# Patient Record
Sex: FEMALE | Race: OTHER | Employment: STUDENT | URBAN - NONMETROPOLITAN AREA
[De-identification: names, ages, dates, MRNs, and addresses within clinical notes are randomized per-mention and may not be internally consistent; named-entity substitution may affect disease eponyms.]

---
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
## 2022-10-16 NOTE — PROGRESS NOTES
Mack Bowden is here at 17w4d for:    Chief Complaint   Patient presents with    Routine Prenatal Visit     no complaints. Estimated Due Date: Estimated Date of Delivery: 3/22/19    OB History    Para Term  AB Living   1             SAB TAB Ectopic Molar Multiple Live Births                    # Outcome Date GA Lbr Vick/2nd Weight Sex Delivery Anes PTL Lv   1 Current                    Past Medical History:   Diagnosis Date    ADHD     ADHD     Nail-patella syndrome        History reviewed. No pertinent surgical history. History   Smoking Status    Passive Smoke Exposure - Never Smoker   Smokeless Tobacco    Never Used        History   Alcohol Use No       No results found for this visit on 10/16/18. Vitals:  /78   Wt 174 lb 6.4 oz (79.1 kg)   LMP 06/15/2018   Estimated body mass index is 25.23 kg/m² as calculated from the following:    Height as of 18: 5' 4\" (1.626 m). Weight as of 18: 147 lb (66.7 kg). Labs:    No results found for this visit on 10/16/18. HPI: pt here today states she is doing well. Pt does not feel movements yet    Yes PT denies fever, chills, nausea and vomiting       Abdomen: enlarged, u/u     See prenatal vital sign section and fetal assessment section    ASSESSMENT & Plan    Diagnosis Orders   1. 17 weeks gestation of pregnancy     2. Supervision of normal first teen pregnancy, second trimester               I am having Ms. Sánchez maintain her Prenatal MV-Min-Fe Fum-FA-DHA (PRENATAL 1 PO) and ondansetron. Return in about 3 weeks (around 2018) for OB 20 wk u/s. Patient Instructions     Patient Education        Weeks 18 to 25 of Your Pregnancy: Care Instructions  Your Care Instructions    Your baby is continuing to develop quickly. At this stage, babies can now suck their thumbs,  firmly with their hands, and open and close their eyelids. Sometime between 18 and 22 weeks, you will start to feel your baby move.  At PROVIDER:[TOKEN:[[id number]]],PROVIDER:[TOKEN:[[id number]]]

## (undated) DEVICE — CHLORAPREP 26ML ORANGE

## (undated) DEVICE — COVER LT HNDL BLU PLAS

## (undated) DEVICE — ELECTRODE ES AD CRD L15FT DISP FOR PT BELOW 30LB REM

## (undated) DEVICE — STAPLER EXT SKIN 35 WIDE S STL STPL SQUEEZE HNDL VISISTAT

## (undated) DEVICE — LABEL SYS CORR MED

## (undated) DEVICE — SUTURE VCRL SZ 3-0 L36IN ABSRB UD L36MM CT-1 1/2 CIR J944H

## (undated) DEVICE — GLOVE SURG SZ 65 THK91MIL LTX FREE SYN POLYISOPRENE

## (undated) DEVICE — SUTURE ABSORBABLE BRAIDED 0 CT 36 IN DA UD VICRYL VCP958H

## (undated) DEVICE — TRAY CATH 16FR F INCLUDE LUB DRNGE BG STATLOK STBL DEV

## (undated) DEVICE — SPONGE COUNTING BAG: Brand: DEVON

## (undated) DEVICE — SUTURE MCRYL + SZ 4 0 L18IN ABSRB UD PC 3 L16MM 3 8 CIR PRIM MCP845G

## (undated) DEVICE — PACK PROCEDURE SURG C SECT CUST STRL

## (undated) DEVICE — 3M™ STERI-STRIP™ REINFORCED ADHESIVE SKIN CLOSURES, R1547, 1/2 IN X 4 IN (12 MM X 100 MM), 6 STRIPS/ENVELOPE: Brand: 3M™ STERI-STRIP™